# Patient Record
Sex: MALE | URBAN - METROPOLITAN AREA
[De-identification: names, ages, dates, MRNs, and addresses within clinical notes are randomized per-mention and may not be internally consistent; named-entity substitution may affect disease eponyms.]

---

## 2022-08-03 ENCOUNTER — OFFICE VISIT (OUTPATIENT)
Dept: FAMILY MEDICINE CLINIC | Age: 37
End: 2022-08-03
Payer: COMMERCIAL

## 2022-08-03 VITALS
BODY MASS INDEX: 30.63 KG/M2 | WEIGHT: 190.6 LBS | SYSTOLIC BLOOD PRESSURE: 118 MMHG | OXYGEN SATURATION: 98 % | HEIGHT: 66 IN | DIASTOLIC BLOOD PRESSURE: 75 MMHG | HEART RATE: 82 BPM

## 2022-08-03 DIAGNOSIS — Z76.89 ESTABLISHING CARE WITH NEW DOCTOR, ENCOUNTER FOR: ICD-10-CM

## 2022-08-03 DIAGNOSIS — Z00.00 WELL ADULT EXAM: Primary | ICD-10-CM

## 2022-08-03 PROCEDURE — 99385 PREV VISIT NEW AGE 18-39: CPT | Performed by: FAMILY MEDICINE

## 2022-08-03 RX ORDER — ESOMEPRAZOLE MAGNESIUM 20 MG/1
20 FOR SUSPENSION ORAL DAILY
COMMUNITY

## 2022-08-03 SDOH — ECONOMIC STABILITY: FOOD INSECURITY: WITHIN THE PAST 12 MONTHS, YOU WORRIED THAT YOUR FOOD WOULD RUN OUT BEFORE YOU GOT MONEY TO BUY MORE.: NEVER TRUE

## 2022-08-03 SDOH — ECONOMIC STABILITY: FOOD INSECURITY: WITHIN THE PAST 12 MONTHS, THE FOOD YOU BOUGHT JUST DIDN'T LAST AND YOU DIDN'T HAVE MONEY TO GET MORE.: NEVER TRUE

## 2022-08-03 ASSESSMENT — PATIENT HEALTH QUESTIONNAIRE - PHQ9
2. FEELING DOWN, DEPRESSED OR HOPELESS: 0
SUM OF ALL RESPONSES TO PHQ9 QUESTIONS 1 & 2: 0
SUM OF ALL RESPONSES TO PHQ QUESTIONS 1-9: 0
1. LITTLE INTEREST OR PLEASURE IN DOING THINGS: 0

## 2022-08-03 ASSESSMENT — SOCIAL DETERMINANTS OF HEALTH (SDOH): HOW HARD IS IT FOR YOU TO PAY FOR THE VERY BASICS LIKE FOOD, HOUSING, MEDICAL CARE, AND HEATING?: NOT HARD AT ALL

## 2022-08-08 ENCOUNTER — HOSPITAL ENCOUNTER (OUTPATIENT)
Age: 37
Setting detail: SPECIMEN
Discharge: HOME | End: 2022-08-08

## 2022-08-08 DIAGNOSIS — Z00.00 WELL ADULT EXAM: ICD-10-CM

## 2022-08-08 LAB
ABSOLUTE EOS #: 0.2 K/UL (ref 0–0.44)
ABSOLUTE IMMATURE GRANULOCYTE: <0.03 K/UL (ref 0–0.3)
ABSOLUTE LYMPH #: 2.52 K/UL (ref 1.1–3.7)
ABSOLUTE MONO #: 0.39 K/UL (ref 0.1–1.2)
BASOPHILS # BLD: 1 % (ref 0–2)
BASOPHILS ABSOLUTE: 0.04 K/UL (ref 0–0.2)
EOSINOPHILS RELATIVE PERCENT: 4 % (ref 1–4)
HCT VFR BLD CALC: 44.8 % (ref 40.7–50.3)
HEMOGLOBIN: 14.8 G/DL (ref 13–17)
IMMATURE GRANULOCYTES: 0 %
LYMPHOCYTES # BLD: 56 % (ref 24–43)
MCH RBC QN AUTO: 28.3 PG (ref 25.2–33.5)
MCHC RBC AUTO-ENTMCNC: 33 G/DL (ref 28.4–34.8)
MCV RBC AUTO: 85.7 FL (ref 82.6–102.9)
MONOCYTES # BLD: 9 % (ref 3–12)
NRBC AUTOMATED: 0 PER 100 WBC
PDW BLD-RTO: 13.9 % (ref 11.8–14.4)
PLATELET # BLD: 332 K/UL (ref 138–453)
PMV BLD AUTO: 10.5 FL (ref 8.1–13.5)
RBC # BLD: 5.23 M/UL (ref 4.21–5.77)
SEG NEUTROPHILS: 30 % (ref 36–65)
SEGMENTED NEUTROPHILS ABSOLUTE COUNT: 1.34 K/UL (ref 1.5–8.1)
WBC # BLD: 4.5 K/UL (ref 3.5–11.3)

## 2022-08-09 LAB
ALBUMIN SERPL-MCNC: 4.6 G/DL (ref 3.5–5.2)
ALBUMIN/GLOBULIN RATIO: 1.6 (ref 1–2.5)
ALP BLD-CCNC: 47 U/L (ref 40–129)
ALT SERPL-CCNC: 57 U/L (ref 5–41)
ANION GAP SERPL CALCULATED.3IONS-SCNC: 21 MMOL/L (ref 9–17)
AST SERPL-CCNC: 34 U/L
BILIRUB SERPL-MCNC: 0.9 MG/DL (ref 0.3–1.2)
BUN BLDV-MCNC: 11 MG/DL (ref 6–20)
CALCIUM SERPL-MCNC: 9.8 MG/DL (ref 8.6–10.4)
CHLORIDE BLD-SCNC: 104 MMOL/L (ref 98–107)
CHOLESTEROL/HDL RATIO: 3.8
CHOLESTEROL: 215 MG/DL
CO2: 19 MMOL/L (ref 20–31)
CREAT SERPL-MCNC: 0.65 MG/DL (ref 0.7–1.2)
GFR AFRICAN AMERICAN: >60 ML/MIN
GFR NON-AFRICAN AMERICAN: >60 ML/MIN
GFR SERPL CREATININE-BSD FRML MDRD: ABNORMAL ML/MIN/{1.73_M2}
GLUCOSE BLD-MCNC: 117 MG/DL (ref 70–99)
HDLC SERPL-MCNC: 56 MG/DL
LDL CHOLESTEROL: 142 MG/DL (ref 0–130)
POTASSIUM SERPL-SCNC: 4.7 MMOL/L (ref 3.7–5.3)
SODIUM BLD-SCNC: 144 MMOL/L (ref 135–144)
THYROXINE, FREE: 1.33 NG/DL (ref 0.93–1.7)
TOTAL PROTEIN: 7.5 G/DL (ref 6.4–8.3)
TRIGL SERPL-MCNC: 86 MG/DL
TSH SERPL DL<=0.05 MIU/L-ACNC: 1.31 UIU/ML (ref 0.3–5)

## 2022-08-28 NOTE — PROGRESS NOTES
[facility]  [facility] [address] [place] [address]  Dept: [phone]      Ralph Tijerina is a 40 y.o. male who presents today for follow up on his  medical conditions as noted below. Chief Complaint   Patient presents with    New Patient       There is no problem list on file for this patient. History reviewed. No pertinent past medical history. History reviewed. No pertinent surgical history. History reviewed. No pertinent family history. Current Outpatient Medications   Medication Sig Dispense Refill    Multiple Vitamin (MULTI VITAMIN DAILY PO) Take by mouth      esomeprazole Magnesium (NEXIUM) 20 MG PACK Take 20 mg by mouth in the morning. No current facility-administered medications for this visit. ALLERGIES:  No Known Allergies    Social History     Tobacco Use    Smoking status: Former     Packs/day: 1.00     Years: 12.00     Pack years: 12.00     Types: Cigarettes     Quit date: 2019     Years since quitting: 3.6    Smokeless tobacco: Never   Substance Use Topics    Alcohol use: Not on file        LDL Cholesterol (mg/dL)   Date Value   08/08/2022 142 (H)     HDL (mg/dL)   Date Value   08/08/2022 56     BUN (mg/dL)   Date Value   08/08/2022 11     Creatinine (mg/dL)   Date Value   08/08/2022 0.65 (L)     Glucose (mg/dL)   Date Value   08/08/2022 117 (H)              Subjective:      HPI  He is being seen today as a new patient to establish care for well visit overall he is doing fine he does not have any serious ongoing medical problems he occasionally gets reflux that he takes Nexium over-the-counter for and he takes a multivitamin he has not done laboratory studies in some time    Review of Systems:     Constitutional: Negative for fever, appetite change and fatigue. Family social and medical history reviewed and unchanged     HENT: Negative. Negative for nosebleeds, trouble swallowing and neck pain. Eyes: Negative for photophobia and visual disturbance. Respiratory: Negative. Negative for chest tightness and shortness of breath. Cardiovascular: Negative. Negative for chest pain and leg swelling. Gastrointestinal: Negative. Negative for abdominal pain and blood in stool. Endocrine: Negative for cold intolerance and polyuria. Genitourinary: Negative for dysuria and hematuria. Musculoskeletal: Negative. Skin: Negative for rash. Allergic/Immunologic: Negative. Neurological: Negative. Negative for dizziness, weakness and numbness. Hematological: Negative. Negative for adenopathy. Does not bruise/bleed easily. Psychiatric/Behavioral: Negative for sleep disturbance, dysphoric mood and  decreased concentration. The patient is not nervous/anxious. Objective:     Physical Exam:     Nursing note and vitals reviewed. /75   Pulse 82   Ht 5' 6\" (1.676 m)   Wt 190 lb 9.6 oz (86.5 kg)   SpO2 98%   BMI 30.76 kg/m²   Constitutional: He is oriented to person, place, and time. He   appears well-developed and well-nourished. HENT:   Head: Normocephalic and atraumatic. Right Ear: External ear normal. Tympanic membrane is not erythematous. No middle ear effusion. Left Ear: External ear normal. Tympanic membrane is not erythematous. No middle ear effusion. Nose: No mucosal edema. Mouth/Throat: Oropharynx is clear and moist. No posterior oropharyngeal erythema. Eyes: Conjunctivae and EOM are normal. Pupils are equal, round, and reactive to light. Neck: Normal range of motion. Neck supple. No thyromegaly present. Cardiovascular: Normal rate, regular rhythm and normal heart sounds. No murmur heard. Pulmonary/Chest: Effort normal and breath sounds normal. He has no wheezes. Hehas no rales. Abdominal: Soft. Bowel sounds are normal. He exhibits no distension and no mass. There is no tenderness. There is no rebound and no guarding. Genitourinary/Anorectal:deferred  Musculoskeletal: Normal range of motion. He exhibits no edema or tenderness. Lymphadenopathy: He has no cervical adenopathy. Neurological: He is alert and oriented to person, place, and time. He has normal reflexes. Skin: Skin is warm and dry. No rash noted. Psychiatric: He has a normal mood and affect. His   behavior is normal.       Assessment:      1. Well adult exam    2. Establishing care with new doctor, encounter for          Plan:      Call or return to clinic prn if these symptoms worsen or fail to improve as anticipated. I have reviewed the instructions with the patient, answering all questions to his satisfaction. No follow-ups on file. Orders Placed This Encounter   Procedures    CBC with Auto Differential     Standing Status:   Future     Number of Occurrences:   1     Standing Expiration Date:   2/3/2023    Comprehensive Metabolic Panel     Standing Status:   Future     Number of Occurrences:   1     Standing Expiration Date:   2/3/2023    Lipid Panel     Standing Status:   Future     Number of Occurrences:   1     Standing Expiration Date:   2/3/2023     Order Specific Question:   Is Patient Fasting?/# of Hours     Answer:   yes    T4, Free     Standing Status:   Future     Number of Occurrences:   1     Standing Expiration Date:   2/3/2023    TSH     Standing Status:   Future     Number of Occurrences:   1     Standing Expiration Date:   2/3/2023     No orders of the defined types were placed in this encounter.     Fu prn  Electronically signed by Ling Rico DO on 8/28/2022 at 7:35 PM

## 2022-09-19 ENCOUNTER — HOSPITAL ENCOUNTER (OUTPATIENT)
Age: 37
Setting detail: SPECIMEN
Discharge: HOME | End: 2022-09-19

## 2022-09-19 ENCOUNTER — OFFICE VISIT (OUTPATIENT)
Dept: FAMILY MEDICINE CLINIC | Age: 37
End: 2022-09-19
Payer: COMMERCIAL

## 2022-09-19 VITALS
WEIGHT: 186.2 LBS | HEIGHT: 66 IN | OXYGEN SATURATION: 98 % | HEART RATE: 72 BPM | BODY MASS INDEX: 29.92 KG/M2 | SYSTOLIC BLOOD PRESSURE: 127 MMHG | DIASTOLIC BLOOD PRESSURE: 87 MMHG

## 2022-09-19 DIAGNOSIS — M62.830 SPASM OF THORACIC BACK MUSCLE: ICD-10-CM

## 2022-09-19 DIAGNOSIS — R74.8 ELEVATED LIVER ENZYMES: ICD-10-CM

## 2022-09-19 DIAGNOSIS — Z13.1 SCREENING FOR DIABETES MELLITUS: ICD-10-CM

## 2022-09-19 DIAGNOSIS — E55.9 VITAMIN D DEFICIENCY: Primary | ICD-10-CM

## 2022-09-19 DIAGNOSIS — E55.9 VITAMIN D DEFICIENCY: ICD-10-CM

## 2022-09-19 LAB
ALBUMIN SERPL-MCNC: 4.9 G/DL (ref 3.5–5.2)
ALBUMIN/GLOBULIN RATIO: 1.8 (ref 1–2.5)
ALP BLD-CCNC: 49 U/L (ref 40–129)
ALT SERPL-CCNC: 53 U/L (ref 5–41)
AST SERPL-CCNC: 28 U/L
BILIRUB SERPL-MCNC: 1.1 MG/DL (ref 0.3–1.2)
BILIRUBIN DIRECT: 0.2 MG/DL
BILIRUBIN, INDIRECT: 0.9 MG/DL (ref 0–1)
ESTIMATED AVERAGE GLUCOSE: 134 MG/DL
HBA1C MFR BLD: 6.3 % (ref 4–6)
TOTAL PROTEIN: 7.7 G/DL (ref 6.4–8.3)
VITAMIN D 25-HYDROXY: 29.4 NG/ML

## 2022-09-19 PROCEDURE — G8417 CALC BMI ABV UP PARAM F/U: HCPCS | Performed by: FAMILY MEDICINE

## 2022-09-19 PROCEDURE — 1036F TOBACCO NON-USER: CPT | Performed by: FAMILY MEDICINE

## 2022-09-19 PROCEDURE — 99214 OFFICE O/P EST MOD 30 MIN: CPT | Performed by: FAMILY MEDICINE

## 2022-09-19 PROCEDURE — G8427 DOCREV CUR MEDS BY ELIG CLIN: HCPCS | Performed by: FAMILY MEDICINE

## 2022-09-19 ASSESSMENT — PATIENT HEALTH QUESTIONNAIRE - PHQ9
SUM OF ALL RESPONSES TO PHQ QUESTIONS 1-9: 0
1. LITTLE INTEREST OR PLEASURE IN DOING THINGS: 0
SUM OF ALL RESPONSES TO PHQ QUESTIONS 1-9: 0
SUM OF ALL RESPONSES TO PHQ9 QUESTIONS 1 & 2: 0
2. FEELING DOWN, DEPRESSED OR HOPELESS: 0

## 2022-09-19 NOTE — PROGRESS NOTES
[facility] MEDICINE  [facility] [address] [place] [address]  Dept: [phone]      Jabari Delgado is a 40 y.o. male who presents today for follow up on his  medical conditions as noted below. Chief Complaint   Patient presents with    Neck Pain    Other     Patient wants to know vitamin D levels         There is no problem list on file for this patient. History reviewed. No pertinent past medical history. History reviewed. No pertinent surgical history. History reviewed. No pertinent family history. Current Outpatient Medications   Medication Sig Dispense Refill    Multiple Vitamin (MULTI VITAMIN DAILY PO) Take by mouth      esomeprazole Magnesium (NEXIUM) 20 MG PACK Take 20 mg by mouth in the morning. No current facility-administered medications for this visit.      ALLERGIES:  No Known Allergies    Social History     Tobacco Use    Smoking status: Former     Packs/day: 1.00     Years: 12.00     Pack years: 12.00     Types: Cigarettes     Quit date: 2019     Years since quitting: 3.7    Smokeless tobacco: Never   Substance Use Topics    Alcohol use: Not on file        LDL Cholesterol (mg/dL)   Date Value   08/08/2022 142 (H)     HDL (mg/dL)   Date Value   08/08/2022 56     BUN (mg/dL)   Date Value   08/08/2022 11     Creatinine (mg/dL)   Date Value   08/08/2022 0.65 (L)     Glucose (mg/dL)   Date Value   08/08/2022 117 (H)              Subjective:      HPI  Is being seen today for follow-up on laboratory studies he was concerned about his vitamin D  His blood sugar and his liver enzyme with I did mention that he should get rechecked but he wants to do that now  He also states for some time now he has had ongoing thoracic discomfort bilaterally into his trapezius region he did go to chiropractor denies any help he does not remember any specific injury    Review of Systems:     Constitutional: Negative for fever, appetite change and fatigue. Family social and medical history reviewed and unchanged     HENT: Negative. Negative for nosebleeds, trouble swallowing and neck pain. Eyes: Negative for photophobia and visual disturbance. Respiratory: Negative. Negative for chest tightness and shortness of breath. Cardiovascular: Negative. Negative for chest pain and leg swelling. Gastrointestinal: Negative. Negative for abdominal pain and blood in stool. Endocrine: Negative for cold intolerance and polyuria. Genitourinary: Negative for dysuria and hematuria. Musculoskeletal: Negative. Skin: Negative for rash. Allergic/Immunologic: Negative. Neurological: Negative. Negative for dizziness, weakness and numbness. Hematological: Negative. Negative for adenopathy. Does not bruise/bleed easily. Psychiatric/Behavioral: Negative for sleep disturbance, dysphoric mood and  decreased concentration. The patient is not nervous/anxious. Objective:     Physical Exam:     Nursing note and vitals reviewed. /87   Pulse 72   Ht 5' 6\" (1.676 m)   Wt 186 lb 3.2 oz (84.5 kg)   SpO2 98%   BMI 30.05 kg/m²   Constitutional: He is oriented to person, place, and time. He   appears well-developed and well-nourished. HENT:   Head: Normocephalic and atraumatic. Right Ear: External ear normal. Tympanic membrane is not erythematous. No middle ear effusion. Left Ear: External ear normal. Tympanic membrane is not erythematous. No middle ear effusion. Nose: No mucosal edema. Mouth/Throat: Oropharynx is clear and moist. No posterior oropharyngeal erythema. Eyes: Conjunctivae and EOM are normal. Pupils are equal, round, and reactive to light. Neck: Normal range of motion. Neck supple. No thyromegaly present. Cardiovascular: Normal rate, regular rhythm and normal heart sounds. No murmur heard. Pulmonary/Chest: Effort normal and breath sounds normal. He has no wheezes. Hehas no rales. Abdominal: Soft.  Bowel sounds are normal. He exhibits no distension and no mass. There is no tenderness. There is no rebound and no guarding. Genitourinary/Anorectal:deferred  Musculoskeletal: Normal range of motion. He exhibits no edema or tenderness. Tightness bilateral trapezius areas full range of motion of his upper extremities and neck  Lymphadenopathy: He has no cervical adenopathy. Neurological: He is alert and oriented to person, place, and time. He has normal reflexes. Skin: Skin is warm and dry. No rash noted. Psychiatric: He has a normal mood and affect. His   behavior is normal.       Assessment:      1. Vitamin D deficiency    2. Screening for diabetes mellitus    3. Elevated liver enzymes    4. Spasm of thoracic back muscle          Plan:      Call or return to clinic prn if these symptoms worsen or fail to improve as anticipated. I have reviewed the instructions with the patient, answering all questions to his satisfaction. No follow-ups on file. Orders Placed This Encounter   Procedures    Vitamin D 25 Hydroxy     Standing Status:   Future     Standing Expiration Date:   12/19/2022    Hemoglobin A1C     Standing Status:   Future     Standing Expiration Date:   10/19/2022    Hepatic Function Panel     Standing Status:   Future     Standing Expiration Date:   3/19/2023    [id number] [facility] Physical Therapy [facility]     Referral Priority:   Routine     Referral Type:   Eval and Treat     Referral Reason:   Specialty Services Required     Requested Specialty:   Physical Therapist     Number of Visits Requested:   1     No orders of the defined types were placed in this encounter.       Electronically signed by Darryle Goo, DO on 9/19/2022 at 12:15 PM

## 2022-09-23 ENCOUNTER — HOSPITAL ENCOUNTER (OUTPATIENT)
Dept: PHYSICAL THERAPY | Facility: CLINIC | Age: 37
Setting detail: THERAPIES SERIES
Discharge: HOME | End: 2022-09-23
Payer: COMMERCIAL

## 2022-09-23 PROCEDURE — 97161 PT EVAL LOW COMPLEX 20 MIN: CPT

## 2022-09-23 NOTE — CONSULTS
[] [facility]) - [facility] &  Therapy  [address]  [phone]  F: [phone] [x] [address]  [place] [address]   [address]  P: [phone]  F: [phone] [] [address] &  Therapy  [address]  P: [phone]  F: [phone] [] [address]  P: [phone]  F: [phone] [] [address]  [facility]   [address]   [place]: [phone]  F: [phone]          Physical Therapy Spine Evaluation    Date:  2022  Patient: Giovany Bishop  : 1985  MRN: [id number]  Physician: Harvey Reagan DO  Insurance: [place] [employer] (limit  remain)  Medical Diagnosis: M62.830 (ICD-10-CM) - Spasm of thoracic back muscle  Rehab Codes: M25.60, M54.2, R29.3  Onset Date: 21  Next 's appt.: TBD    Subjective:   Pt initially did not require  however was having difficulty comprehending therapist's subjective questions thus agreed to use [place] . Pt arrived to physical therapy with c/o neck pain with muscle tightness and spasm along B upper trap/levator scapulae to middle trap/rhomboids area. Pt noted this occurred about a yea ago without known cause but was first noticing pain and muscle spasm when driving. 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 Pt noted intermittent numbness/tingling in RUE after holding phone for about 2 hours straight or more.   Pt reported went to see Chiropractor for only 1 session d/t having to move city, noted \"he cracks my neck and did imaging\", noted \"imaging says either an infection or inflammation in that area\" as pt pointed to C7 and T1-T3. Pt noted ibuprofen, massage with Tiger palm, and Tiger palm pan relief patch help reducing symptoms. Pt noted also bought an upper back brace to wear for support to improve posture when studying which also helped. Pain present? [] Yes  [x] No    Location  Neck, B upper trap/levator, cervical paraspinals    Pain Rating currently  (0-10 scale)   0/10   Pain at worse 10/10   Pain at best 0/10   Description of pain \"Tight, spasm, pulling\"   Altered Sensation Intermittent numbness/tingling to RUE when holding phone for +2hrs   What makes it worse Prolonged sitting on floor to study on laptop for 1/2 hours or more, stress about exams   Looking at phone for 2 hours or more  Driving   What makes it better On days with no class, no study, no exam   Symptom progression Same    Sleep Ok        Comorbidities:   [] Obesity [] Dialysis  [x] N/A   [] Asthma/COPD [] Dementia [] Other:   [] Stroke [] Sleep apnea [] Other:   [] Vascular disease [] Rheumatic disease [] Other:          PMHx: [x] Refer to full medical chart  In EPIC       [] Unremarkable [] Diabetes    [] HTN        [] Pacemaker      [] MI/Heart Problems       [] Cancer      [] Arthritis  [] Other: [] Other:       Tests: none recently   [x] X-Ray: (past) from Chiropractor before moving to [place]   [] MRI:    [] Other:      Medications: [x] Refer to full medical record                          [] None                          [] Other:    Allergies:      [x] Refer to full medical record                         [] None                         [] Other:      ADL/IADL Previous level of function Current level of function Who currently assists the patient with task   Bathing  [x] Independent  [] Assist [x] Independent  [] Assist    Dress/grooming [x] Independent  [] Assist [x] Independent  [] Assist    Transfer/mobility [x] Independent  [] Assist [x] Independent  [] Assist    Feeding [x] Independent  [] Assist [] Independent  [] Assist    Toileting [x] Independent  [] Assist [x] Independent  [] Assist    Driving [x] Independent  [] Assist [x] Independent  [] Assist    Housekeeping [x] Independent  [] Assist [x] Independent  [] Assist    Grocery shop/meal prep [x] Independent  [] Assist [x] Independent  [] Assist        Gait Prior level of function Current level of function    [x] Independent  [] Assist [x] Independent  [] Assist   Device: [x] Independent [x] Independent    [] Straight Cane [] Quad cane [] Straight Cane [] Quad cane    [] Standard walker [] Rolling walker   [] 4 wheeled walker [] Standard walker [] Rolling walker   [] 4 wheeled walker    [] Wheelchair [] Wheelchair         Objective:    OBSERVATION No Deficit Deficit Not Tested Comments   Posture       Forward Head [] [x] []    Rounded Shoulders [] [x] []    Kyphosis [] [x] []    Lordosis [] [] [x]    Leg Length Discrp [] [] [x]    Slumped Sitting [] [x] []    Palpation [] [x] [] B scapular winging  High tone throughout cervical paraspinals, B upper trap/levator scap to mid trap/rhomboids  TTP along cervical paraspinals, B upper trap/levator scap, and T2-T4 transverse progresses   Sensation [x] [] []    Edema [x] [] []    Neurological [x] [] []              STRENGTH    Left Right   C5 Shld Abd 5/5 5/5   Shld Flexion 5/5 5/5   Shld IR 5/5 5/5   Shld ER 5/5 5/5   C6 Elb Flex 5/5 5/5   C7 Elb Ext 5/5 5/5   *painful     ROM   Cervical    Flexion WFL   Extension 15% limited (+) pain/tightness   Sidebend  L 20% limited  (+) tightness R upper trap/levator scap   R 20% limited  (+) tightness L upper trap/levator scap    Rotation L 20% restricted   (+) tightness L upper trap R WFL           Functional Test: NDI Score: 13/50 or 26% functionally impaired         Assessment: 41 yo male presents to physical therapy with c/o neck pain and muscle spasm along cervical paraspinals, B upper trap/levator scapulae areas occurred about a year ago.   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 Pt reported pain increased about 1/2 hour of sitting on floor to study for exam, when pt is stress about upcoming exams, when driving long distances, and intermittent numbness/tingling presents when holding phone for 2 hours straight. Pt demo restricted cervical ROM in extension, B SB, L rot, and suboccipitals, decreased deep neck flexors and scapulothoracic stabilizers strength with palpable B scapular winging and forward head/rounded shoulder posture presents. Patient would benefit from skilled physical therapy services in order to: manage pain, improve cervical mobility, improve deep neck flexors and scapulothoracic stabilizers strength, and promote proper posture to assist pt in returning to prior function. Problems:    [x] ? Pain: up to 10/10 neck, B upper trap/levator scapulae, cervical paraspinals   [x] ? ROM: cervical extension, B SB, L rot, suboccipitals   [x] ? Strength: deep neck flexors, scapulothoracic stabilizers   [x] ? Function: NDI 26% impaired  [x] Other: impaired posture         LTG: (to be met in 5 treatments)  Pt will reduce pain to less than or equal to 5/10 to reduce difficulty when studying for exams and long distance driving  Pt will be able to perform 20x chin tuck without cues provided and proper technique to demo improved activation and awareness of deep neck flexors and suboccipitals tissue mobility   Pt will report improved study ergonomics to reduce muscle straining throughout paraspinals to reduce risk of re-injury  Pt will be able to maintain proper upright posture without cues provided for at least 30 mins to demo improved postural awareness   Patient to be independent with home exercise program as demonstrated by performance with correct form without cues.   Pt will improve NDI score from 26% impaired to less than 16% impaired to demo improved functional mobility to participate in daily functional activities        Patient goals: \"treat my pain\"         Rehab Potential:  [x] Good  [] Fair  [] Poor   Suggested Professional Referral:  [x] No  [] Yes:  Barriers to Goal Achievement:  [x] No  [] Yes:  Domestic Concerns:  [x] No  [] Yes:      Pt. Education:  [x] Plans/Goals, Risks/Benefits discussed  [x] Home exercise program  Method of Education: [x] Verbal  [x] Demo  [x] Written  Comprehension of Education:  [x] Verbalizes understanding. [x] Demonstrates understanding. [] Needs Review. [] Demonstrates/verbalizes understanding of HEP/Ed previously given. Access Code: [email or web address]  URL: [email or web address]  Date: 09/23/2022  Prepared by:  Beryl Fenton    Exercises  Seated Scapular Retraction - 1 x daily - 7 x weekly - 2 sets - 10 reps - 5s hold  Seated Upper Trapezius Stretch - 1 x daily - 7 x weekly - 1 sets - 3 reps - 30s hold  Seated Levator Scapulae Stretch - 1 x daily - 7 x weekly - 1 sets - 3 reps - 30s hold  Seated Cervical Retraction - 1 x daily - 7 x weekly - 1 sets - 10 reps - 5s hold  Seated Shoulder Rolls - 1 x daily - 7 x weekly - 2 sets - 10 reps  Seated Cervical Rotation AROM - 1 x daily - 7 x weekly - 2 sets - 10 reps  Seated Cervical Sidebending AROM - 1 x daily - 7 x weekly - 2 sets - 10 reps  Standing Cervical Extension AROM - 1 x daily - 7 x weekly - 2 sets - 10 reps  Standing Cervical Flexion AROM - 1 x daily - 7 x weekly - 2 sets - 10 reps  Seated Correct Posture - 1 x daily - 7 x weekly - 2 sets - 10 reps            Treatment Plan:  [x] Therapeutic Exercise   13191  [] Iontophoresis: 4 mg/mL Dexamethasone Sodium Phosphate  mAmin  35303   [] Therapeutic Activity  13851 [] Vasopneumatic cold with compression  90073    [] Gait Training   82889 [] Ultrasound   [id number]   [] Neuromuscular Re-education  08121 [] Electrical Stimulation Unattended  61209   [x] Manual Therapy  68853 [] Electrical Stimulation Attended  [id number]   [x] Instruction in HEP  [] Lumbar/Cervical Traction  [id number]   [] Aquatic Therapy   12408 [x] Cold/hotpack    [] Massage   [id number] [] Dry Needling, 1 or 2 muscles  25654   [] Biofeedback, first 15 minutes   52112  [] Biofeedback, additional 15 minutes   17764 [] Dry Needling, 3 or more muscles  65844       Frequency:  1 x/week for 5 visits        Todays Treatment:  Modalities:   Precautions: Greenlandic    Exercises:  Exercise Reps/ Time Weight/ Level Comments         Seated      B UT/levator scap stretch 10s ea     Scap squeeze 10x5s                 Other:   - Postural education  - Study ergonomics: instruct to sit in chair with back support & use towel roll to place behind back to cues for upright posture, use table with appropriate height; take breaks every 30-45 mins from studying to complete stretching and cervical mobility per HEP to avoid prolonged rounded shoulder and kyphotic posture when study for exam       Specific Instructions for next treatment:  - Improve cervical mobility  - Strengthen B scapulothoracic stabilizers, core, and deep neck flexors  - SOR, hypervolt/manual to B upper trap/levator scap and surrounding musculatures   - Review postural education and study ergonomics       Evaluation Complexity:  History (Personal factors, comorbidities) [x] 0 [] 1-2 [] 3+   Exam (limitations, restrictions) [] 1-2 [x] 3 [] 4+   Clinical presentation (progression) [x] Stable [] Evolving  [] Unstable   Decision Making [x] Low [] Moderate [] High    [] Low Complexity [] Moderate Complexity [] High Complexity       Treatment Charges: Mins Units   [x] Evaluation       [x]  Low       []  Moderate       []  High 40 1   []  Modalities     [x]  Ther Exercise 5 --   []  Manual Therapy     []  Ther Activities     []  Aquatics     []  Vasocompression     []  Other       TOTAL TREATMENT TIME: 45 mins     Time in: 12:35 pm       Time out: 1:30 pm    Electronically signed by:  Beryl Jimenez PT        Physician Signature:________________________________Date:__________________  By signing above or cosigning this note, I have reviewed this plan of care and certify a need for medically necessary rehabilitation services.      *PLEASE SIGN ABOVE AND FAX BACK ALL PAGES*

## 2022-09-30 ENCOUNTER — HOSPITAL ENCOUNTER (OUTPATIENT)
Dept: PHYSICAL THERAPY | Facility: CLINIC | Age: 37
Setting detail: THERAPIES SERIES
Discharge: HOME | End: 2022-09-30
Payer: COMMERCIAL

## 2022-09-30 PROCEDURE — 97110 THERAPEUTIC EXERCISES: CPT

## 2022-09-30 NOTE — FLOWSHEET NOTE
[] [facility]  [address]  [phone]  F: [phone] [x] [address]  [facility]   [address]  P: [phone]  F: [phone] [] [place] &  Therapy  [address] [facility]  P: [phone]  F: [phone] [] [address]  P: [phone]  F: [phone] [] [address]  [facility]   [address]   [place]: [phone]  F: [phone]      Physical Therapy Daily Treatment Note    Date:  2022  Patient Name:  Sonu Lopez    :  1985  MRN: [id number]  Physician: Earle Muñoz DO                     Insurance: [place] [employer] (limit  remain)  Medical Diagnosis: M62.830 (ICD-10-CM) - Spasm of thoracic back muscle  Rehab Codes: M25.60, M54.2, R29.3  Onset Date: 21               Next 's appt.: TBD    Visit# / total visits: 2/5    Cancels/No Shows: 0/0    Subjective:    Pain:  [] Yes  [x] No Location: Neck, B upper trap/levator, cervical paraspinals   Pain Rating: (0-10 scale) 0/10  Pain altered Tx:  [x] No  [] Yes  Action:  Comments: Pt arrives stating he has been completing HEP daily and his pain symptoms have resolved. Pt reports he would like to discontinue therapy at this time.      Objective:  Modalities:   Precautions: English    Exercises:  Exercise Reps/ Time Weight/ Level Comments         Seated         C-AROM 10xea   Flexion/extension/side bend/rotation    B UT stretch  3x30\"ea       Levator scap stretch  3x30\"ea     Scap squeeze 10x5s       Cervical retraction \"chin tucks\"  10x        posterior shoulder rolls  10x              Other:   - Postural education-   - Study ergonomics: instruct to sit in chair with back support & use towel roll to place behind back to cues for upright posture, use table with appropriate height; take breaks every 30-45 mins from studying to complete stretching and cervical mobility per HEP to avoid prolonged rounded shoulder and kyphotic posture when study for exam - reviewed 9/30         Specific Instructions for next treatment:  - Improve cervical mobility  - Strengthen B scapulothoracic stabilizers, core, and deep neck flexors  - SOR, hypervolt/manual to B upper trap/levator scap and surrounding musculatures   - Review postural education and study ergonomics       Treatment Charges: Mins Units   []  Modalities     [x]  Ther Exercise 45 3   []  Manual Therapy     []  Ther Activities     []  Aquatics     []  Vasocompression     []  Other     Total Treatment time 45 3       Assessment: [x] Progressing toward goals. Focused session on education and reviewing proper form/technique of HEP. Provided update handout of thoracic stretches to progress program with good understanding. Also educated on back support such a lumbar roll, wedge, and cervical support to place in vehicle with good understanding. Pt is to be put on hold from therapy at this time as he has no pain symptoms and is compliant with HEP. Therapist advised pt to reach out to clinic if pain symptoms resume and if he has any other questions. [] No change. [] Other:  [x] Patient would continue to benefit from skilled physical therapy services in order to: manage pain, improve cervical mobility, improve deep neck flexors and scapulothoracic stabilizers strength, and promote proper posture to assist pt in returning to prior function. Problems:    [x] ? Pain: up to 10/10 neck, B upper trap/levator scapulae, cervical paraspinals   [x] ? ROM: cervical extension, B SB, L rot, suboccipitals   [x] ? Strength: deep neck flexors, scapulothoracic stabilizers   [x] ?  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 Pt will improve NDI score from 26% impaired to less than 16% impaired to demo improved functional mobility to participate in daily functional activities           Patient goals: \"treat my pain\"     Pt. Education:  [x] Yes  [] No  [x] Reviewed Prior HEP/Ed  Method of Education: [x] Verbal  [x] Demo  [x] Written  Comprehension of Education:  [x] Verbalizes understanding. [x] Demonstrates understanding. [] Needs review. [x] Demonstrates/verbalizes HEP/Ed previously given. Access Code: [email or web address]  URL: [email or web address]  Date: 09/23/2022  Prepared by:  Beryl Barnard     Exercises  Seated Scapular Retraction - 1 x daily - 7 x weekly - 2 sets - 10 reps - 5s hold  Seated Upper Trapezius Stretch - 1 x daily - 7 x weekly - 1 sets - 3 reps - 30s hold  Seated Levator Scapulae Stretch - 1 x daily - 7 x weekly - 1 sets - 3 reps - 30s hold  Seated Cervical Retraction - 1 x daily - 7 x weekly - 1 sets - 10 reps - 5s hold  Seated Shoulder Rolls - 1 x daily - 7 x weekly - 2 sets - 10 reps  Seated Cervical Rotation AROM - 1 x daily - 7 x weekly - 2 sets - 10 reps  Seated Cervical Sidebending AROM - 1 x daily - 7 x weekly - 2 sets - 10 reps  Standing Cervical Extension AROM - 1 x daily - 7 x weekly - 2 sets - 10 reps  Standing Cervical Flexion AROM - 1 x daily - 7 x weekly - 2 sets - 10 reps  Seated Correct Posture - 1 x daily - 7 x weekly - 2 sets - 10 reps  Date: 09/30/2022  Prepared by: Joana Rogel  Doorway Pec Stretch at 90 Degrees Abduction - 1 x daily - 7 x weekly - 3 sets - 10 reps  Shoulder Flexion Wall Slide with Towel - 1 x daily - 7 x weekly - 3 sets - 10 reps  Prone Scapular Retraction Arms at Side - 1 x daily - 7 x weekly - 3 sets - 10 reps  Prone W Scapular Retraction - 1 x daily - 7 x weekly - 3 sets - 10 reps  Sidelying Thoracic Rotation with Open Book - 1 x daily - 7 x weekly - 3 sets - 10 reps  Seated Thoracic Lumbar Extension with Pectoralis Stretch - 1 x daily - 7 x weekly - 3 sets - 10 reps         Plan: [x] Continue current frequency toward long and short term goals. [x] Specific Instructions for subsequent treatments: put on hold- discharge.        Time In: 12:00 pm            Time Out: 12:45  pm    Electronically signed by:  Joana Rogel PTA

## 2022-10-06 NOTE — DISCHARGE SUMMARY
[] [facility]) - [facility] &  Therapy  [address]  [phone]  F: [phone] [x] [address]  [facility]   [address]  P: [phone]  F: [phone] [] [address]  Therapy  [address]  P: [phone]  F: [phone] [] [address]  P: [phone]  F: [phone] [] [address]  [facility]   [address]   [place]: [phone]  F: [phone]    Physical Therapy Discharge Note    Date: 10/6/2022      Patient: Paula Smith  : 1985  MRN: [id number]    Physician: Mayo Irving DO                     Insurance: [place] [employer] (limit  remain)  Medical Diagnosis: M62.830 (ICD-10-CM) - Spasm of thoracic back muscle  Rehab Codes: M25.60, M54.2, R29.3  Onset Date: 21               Next 's appt.: TBD     Visit# / total visits: 2/5                        Cancels/No Shows: 0/0  Date of initial visit: 22                Date of final visit: 22       Discharge Status:     Pt reported symptoms have resolved and would like to discontinue therapy, noted also spoke with referral physician which was given an \"OK\" to be d/c from physical therapy services. Per pt request, pt is now discharged. Electronically signed by: Beryl Jones PT    If you have any questions or concerns, please don't hesitate to call.   Thank you for your referral.

## 2023-02-07 ENCOUNTER — HOSPITAL ENCOUNTER (OUTPATIENT)
Age: 38
Setting detail: SPECIMEN
Discharge: HOME | End: 2023-02-07

## 2023-02-07 ENCOUNTER — OFFICE VISIT (OUTPATIENT)
Dept: FAMILY MEDICINE CLINIC | Age: 38
End: 2023-02-07
Payer: COMMERCIAL

## 2023-02-07 VITALS
OXYGEN SATURATION: 98 % | WEIGHT: 194 LBS | HEART RATE: 70 BPM | HEIGHT: 66 IN | SYSTOLIC BLOOD PRESSURE: 119 MMHG | DIASTOLIC BLOOD PRESSURE: 78 MMHG | BODY MASS INDEX: 31.18 KG/M2

## 2023-02-07 DIAGNOSIS — R74.8 ELEVATED LIVER ENZYMES: Primary | ICD-10-CM

## 2023-02-07 DIAGNOSIS — E55.9 VITAMIN D DEFICIENCY: ICD-10-CM

## 2023-02-07 DIAGNOSIS — R73.03 PRE-DIABETES: ICD-10-CM

## 2023-02-07 DIAGNOSIS — E78.00 PURE HYPERCHOLESTEROLEMIA: ICD-10-CM

## 2023-02-07 LAB — HBA1C MFR BLD: 6.4 %

## 2023-02-07 PROCEDURE — G8417 CALC BMI ABV UP PARAM F/U: HCPCS | Performed by: FAMILY MEDICINE

## 2023-02-07 PROCEDURE — G8427 DOCREV CUR MEDS BY ELIG CLIN: HCPCS | Performed by: FAMILY MEDICINE

## 2023-02-07 PROCEDURE — 83036 HEMOGLOBIN GLYCOSYLATED A1C: CPT | Performed by: FAMILY MEDICINE

## 2023-02-07 PROCEDURE — 1036F TOBACCO NON-USER: CPT | Performed by: FAMILY MEDICINE

## 2023-02-07 PROCEDURE — 99214 OFFICE O/P EST MOD 30 MIN: CPT | Performed by: FAMILY MEDICINE

## 2023-02-07 PROCEDURE — G8484 FLU IMMUNIZE NO ADMIN: HCPCS | Performed by: FAMILY MEDICINE

## 2023-02-07 RX ORDER — METFORMIN HYDROCHLORIDE 500 MG/1
500 TABLET, EXTENDED RELEASE ORAL
Qty: 30 TABLET | Refills: 5 | Status: SHIPPED | OUTPATIENT
Start: 2023-02-07

## 2023-02-07 SDOH — ECONOMIC STABILITY: FOOD INSECURITY: WITHIN THE PAST 12 MONTHS, THE FOOD YOU BOUGHT JUST DIDN'T LAST AND YOU DIDN'T HAVE MONEY TO GET MORE.: NEVER TRUE

## 2023-02-07 SDOH — ECONOMIC STABILITY: HOUSING INSECURITY
IN THE LAST 12 MONTHS, WAS THERE A TIME WHEN YOU DID NOT HAVE A STEADY PLACE TO SLEEP OR SLEPT IN A SHELTER (INCLUDING NOW)?: NO

## 2023-02-07 SDOH — ECONOMIC STABILITY: INCOME INSECURITY: HOW HARD IS IT FOR YOU TO PAY FOR THE VERY BASICS LIKE FOOD, HOUSING, MEDICAL CARE, AND HEATING?: NOT HARD AT ALL

## 2023-02-07 SDOH — ECONOMIC STABILITY: FOOD INSECURITY: WITHIN THE PAST 12 MONTHS, YOU WORRIED THAT YOUR FOOD WOULD RUN OUT BEFORE YOU GOT MONEY TO BUY MORE.: NEVER TRUE

## 2023-02-07 ASSESSMENT — PATIENT HEALTH QUESTIONNAIRE - PHQ9
SUM OF ALL RESPONSES TO PHQ QUESTIONS 1-9: 0
1. LITTLE INTEREST OR PLEASURE IN DOING THINGS: 0
SUM OF ALL RESPONSES TO PHQ QUESTIONS 1-9: 0
SUM OF ALL RESPONSES TO PHQ QUESTIONS 1-9: 0
2. FEELING DOWN, DEPRESSED OR HOPELESS: 0
SUM OF ALL RESPONSES TO PHQ9 QUESTIONS 1 & 2: 0
SUM OF ALL RESPONSES TO PHQ QUESTIONS 1-9: 0

## 2023-02-07 NOTE — PROGRESS NOTES
[facility]  [facility] [address] [place] [address]  Dept: [phone]      Isabelle Fuentes is a 40 y.o. male who presents today for follow up on his  medical conditions as noted below. Chief Complaint   Patient presents with    Other     A1C       There is no problem list on file for this patient. History reviewed. No pertinent past medical history. No past surgical history on file. No family history on file. Current Outpatient Medications   Medication Sig Dispense Refill    metFORMIN (GLUCOPHAGE-XR) 500 MG extended release tablet Take 1 tablet by mouth daily (with breakfast) 30 tablet 5    Multiple Vitamin (MULTI VITAMIN DAILY PO) Take by mouth (Patient not taking: Reported on 2023)      esomeprazole Magnesium (NEXIUM) 20 MG PACK Take 20 mg by mouth in the morning. (Patient not taking: Reported on 2023)       No current facility-administered medications for this visit.      ALLERGIES:  No Known Allergies    Social History     Tobacco Use    Smoking status: Former     Packs/day: 1.00     Years: 12.00     Pack years: 12.00     Types: Cigarettes     Quit date:      Years since quittin.1    Smokeless tobacco: Never   Substance Use Topics    Alcohol use: Not on file        LDL Cholesterol (mg/dL)   Date Value   2022 142 (H)     HDL (mg/dL)   Date Value   2022 56     BUN (mg/dL)   Date Value   2022 11     Creatinine (mg/dL)   Date Value   2022 0.65 (L)     Glucose (mg/dL)   Date Value   2022 117 (H)     Hemoglobin A1C (%)   Date Value   2022 6.3 (H)              Subjective:      HPI  He is here today for follow-up on some abnormal lab studies from September he has been trying to work on his diet a bit his hemoglobin A1c is 6.4 today currently is not on any treatment unfortunately gained a little bit of weight he states he feels fine    Review of Systems:     Constitutional: Negative for fever, appetite change and fatigue. Family social and medical history reviewed and unchanged     HENT: Negative. Negative for nosebleeds, trouble swallowing and neck pain. Eyes: Negative for photophobia and visual disturbance. Respiratory: Negative. Negative for chest tightness and shortness of breath. Cardiovascular: Negative. Negative for chest pain and leg swelling. Gastrointestinal: Negative. Negative for abdominal pain and blood in stool. Endocrine: Negative for cold intolerance and polyuria. Genitourinary: Negative for dysuria and hematuria. Musculoskeletal: Negative. Skin: Negative for rash. Allergic/Immunologic: Negative. Neurological: Negative. Negative for dizziness, weakness and numbness. Hematological: Negative. Negative for adenopathy. Does not bruise/bleed easily. Psychiatric/Behavioral: Negative for sleep disturbance, dysphoric mood and  decreased concentration. The patient is not nervous/anxious. Objective:     Physical Exam:     Nursing note and vitals reviewed. /78   Pulse 70   Ht 5' 6\" (1.676 m)   Wt 194 lb (88 kg)   SpO2 98%   BMI 31.31 kg/m²   Constitutional: He is oriented to person, place, and time. He   appears well-developed and well-nourished. HENT:   Head: Normocephalic and atraumatic. Right Ear: External ear normal. Tympanic membrane is not erythematous. No middle ear effusion. Left Ear: External ear normal. Tympanic membrane is not erythematous. No middle ear effusion. Nose: No mucosal edema. Mouth/Throat: Oropharynx is clear and moist. No posterior oropharyngeal erythema. Eyes: Conjunctivae and EOM are normal. Pupils are equal, round, and reactive to light. Neck: Normal range of motion. Neck supple. No thyromegaly present. Cardiovascular: Normal rate, regular rhythm and normal heart sounds. No murmur heard. Pulmonary/Chest: Effort normal and breath sounds normal. He has no wheezes. Hehas no rales. Abdominal: Soft. Bowel sounds are normal. He exhibits no distension and no mass. There is no tenderness. There is no rebound and no guarding. Genitourinary/Anorectal:deferred  Musculoskeletal: Normal range of motion. He exhibits no edema or tenderness. Lymphadenopathy: He has no cervical adenopathy. Neurological: He is alert and oriented to person, place, and time. He has normal reflexes. Skin: Skin is warm and dry. No rash noted. Psychiatric: He has a normal mood and affect. His   behavior is normal.       Assessment:      1. Elevated liver enzymes    2. Vitamin D deficiency    3. Pure hypercholesterolemia    4. Pre-diabetes          Plan:      Call or return to clinic prn if these symptoms worsen or fail to improve as anticipated. I have reviewed the instructions with the patient, answering all questions to his satisfaction. Return in about 3 months (around 5/7/2023) for dm.   Orders Placed This Encounter   Procedures    Lipid Panel     Standing Status:   Future     Standing Expiration Date:   8/7/2023     Order Specific Question:   Is Patient Fasting?/# of Hours     Answer:   yes    Vitamin D 25 Hydroxy     Standing Status:   Future     Standing Expiration Date:   5/7/2023    Hepatic Function Panel     Standing Status:   Future     Standing Expiration Date:   8/7/2023    Protein / creatinine ratio, urine     Standing Status:   Future     Standing Expiration Date:   2/7/2024    POCT glycosylated hemoglobin (Hb A1C)     Orders Placed This Encounter   Medications    metFORMIN (GLUCOPHAGE-XR) 500 MG extended release tablet     Sig: Take 1 tablet by mouth daily (with breakfast)     Dispense:  30 tablet     Refill:  5     He needs to work on weight loss starting metformin and get other laboratory studies checked and follow-up in 3 months  Electronically signed by Kaci Chavez DO on 2/7/2023 at 12:06 PM   urine

## 2023-02-08 LAB
CREATININE URINE: 235.4 MG/DL (ref 39–259)
TOTAL PROTEIN, URINE: 12 MG/DL
URINE TOTAL PROTEIN CREATININE RATIO: 0.05 (ref 0–0.2)

## 2023-04-14 DIAGNOSIS — R73.03 PRE-DIABETES: ICD-10-CM

## 2023-04-17 RX ORDER — METFORMIN HYDROCHLORIDE 500 MG/1
500 TABLET, EXTENDED RELEASE ORAL
Qty: 30 TABLET | Refills: 5 | Status: SHIPPED | OUTPATIENT
Start: 2023-04-17

## 2023-04-17 NOTE — TELEPHONE ENCOUNTER
Deanna Thomas is calling to request a refill on the following medication(s):    Last Visit Date (If Applicable):  8/9/3336    Next Visit Date:    Visit date not found    Medication Request:  Requested Prescriptions     Pending Prescriptions Disp Refills    metFORMIN (GLUCOPHAGE-XR) 500 MG extended release tablet 30 tablet 5     Sig: Take 1 tablet by mouth daily (with breakfast)

## 2023-04-25 ENCOUNTER — HOSPITAL ENCOUNTER (OUTPATIENT)
Age: 38
Setting detail: SPECIMEN
Discharge: HOME | End: 2023-04-25

## 2023-04-25 DIAGNOSIS — E78.00 PURE HYPERCHOLESTEROLEMIA: ICD-10-CM

## 2023-04-25 DIAGNOSIS — R74.8 ELEVATED LIVER ENZYMES: ICD-10-CM

## 2023-04-25 DIAGNOSIS — R73.03 PRE-DIABETES: ICD-10-CM

## 2023-04-25 DIAGNOSIS — E55.9 VITAMIN D DEFICIENCY: ICD-10-CM

## 2023-04-25 LAB
25(OH)D3 SERPL-MCNC: 28.2 NG/ML
ALBUMIN SERPL-MCNC: 4.7 G/DL (ref 3.5–5.2)
ALBUMIN/GLOBULIN RATIO: 1.6 (ref 1–2.5)
ALP SERPL-CCNC: 42 U/L (ref 40–129)
ALT SERPL-CCNC: 48 U/L (ref 5–41)
AST SERPL-CCNC: 25 U/L
BILIRUB DIRECT SERPL-MCNC: 0.1 MG/DL
BILIRUB INDIRECT SERPL-MCNC: 0.6 MG/DL (ref 0–1)
BILIRUB SERPL-MCNC: 0.7 MG/DL (ref 0.3–1.2)
CHOLEST SERPL-MCNC: 179 MG/DL
CHOLESTEROL/HDL RATIO: 3.5
HDLC SERPL-MCNC: 51 MG/DL
LDLC SERPL CALC-MCNC: 107 MG/DL (ref 0–130)
PROT SERPL-MCNC: 7.6 G/DL (ref 6.4–8.3)
TRIGL SERPL-MCNC: 104 MG/DL

## 2023-05-23 ENCOUNTER — HOSPITAL ENCOUNTER (OUTPATIENT)
Age: 38
Setting detail: SPECIMEN
Discharge: HOME | End: 2023-05-23

## 2023-05-23 ENCOUNTER — OFFICE VISIT (OUTPATIENT)
Dept: FAMILY MEDICINE CLINIC | Age: 38
End: 2023-05-23
Payer: COMMERCIAL

## 2023-05-23 VITALS
BODY MASS INDEX: 29.12 KG/M2 | WEIGHT: 181.2 LBS | HEART RATE: 70 BPM | SYSTOLIC BLOOD PRESSURE: 120 MMHG | HEIGHT: 66 IN | OXYGEN SATURATION: 98 % | DIASTOLIC BLOOD PRESSURE: 81 MMHG

## 2023-05-23 DIAGNOSIS — R05.1 ACUTE COUGH: ICD-10-CM

## 2023-05-23 DIAGNOSIS — R73.03 PRE-DIABETES: ICD-10-CM

## 2023-05-23 DIAGNOSIS — R73.03 PRE-DIABETES: Primary | ICD-10-CM

## 2023-05-23 LAB
EST. AVERAGE GLUCOSE BLD GHB EST-MCNC: 126 MG/DL
HBA1C MFR BLD: 6 % (ref 4–6)

## 2023-05-23 PROCEDURE — G8427 DOCREV CUR MEDS BY ELIG CLIN: HCPCS | Performed by: FAMILY MEDICINE

## 2023-05-23 PROCEDURE — G8417 CALC BMI ABV UP PARAM F/U: HCPCS | Performed by: FAMILY MEDICINE

## 2023-05-23 PROCEDURE — 1036F TOBACCO NON-USER: CPT | Performed by: FAMILY MEDICINE

## 2023-05-23 PROCEDURE — 99214 OFFICE O/P EST MOD 30 MIN: CPT | Performed by: FAMILY MEDICINE

## 2023-05-23 RX ORDER — GUAIFENESIN 600 MG/1
1200 TABLET, EXTENDED RELEASE ORAL 2 TIMES DAILY
Qty: 40 TABLET | Refills: 0 | Status: SHIPPED | OUTPATIENT
Start: 2023-05-23

## 2023-05-23 ASSESSMENT — PATIENT HEALTH QUESTIONNAIRE - PHQ9
SUM OF ALL RESPONSES TO PHQ QUESTIONS 1-9: 0
SUM OF ALL RESPONSES TO PHQ9 QUESTIONS 1 & 2: 0
SUM OF ALL RESPONSES TO PHQ QUESTIONS 1-9: 0
2. FEELING DOWN, DEPRESSED OR HOPELESS: 0
SUM OF ALL RESPONSES TO PHQ QUESTIONS 1-9: 0
SUM OF ALL RESPONSES TO PHQ QUESTIONS 1-9: 0
1. LITTLE INTEREST OR PLEASURE IN DOING THINGS: 0

## 2023-05-23 NOTE — PROGRESS NOTES
[facility]  [facility] [address] [facility] [address]  Dept: [phone]      Lee Graham is a 41 y.o. male who presents today for follow up on his  medical conditions as noted below. Chief Complaint   Patient presents with    Diabetes       There is no problem list on file for this patient. History reviewed. No pertinent past medical history. No past surgical history on file. No family history on file. Current Outpatient Medications   Medication Sig Dispense Refill    guaiFENesin (MUCINEX) 600 MG extended release tablet Take 2 tablets by mouth 2 times daily 40 tablet 0    metFORMIN (GLUCOPHAGE-XR) 500 MG extended release tablet Take 1 tablet by mouth daily (with breakfast) 30 tablet 5    Multiple Vitamin (MULTI VITAMIN DAILY PO) Take by mouth (Patient not taking: Reported on 2023)      esomeprazole Magnesium (NEXIUM) 20 MG PACK Take 20 mg by mouth in the morning. (Patient not taking: Reported on 2023)       No current facility-administered medications for this visit.      ALLERGIES:  No Known Allergies    Social History     Tobacco Use    Smoking status: Former     Packs/day: 1.00     Years: 12.00     Pack years: 12.00     Types: Cigarettes     Quit date:      Years since quittin.3    Smokeless tobacco: Never   Substance Use Topics    Alcohol use: Not on file        LDL Cholesterol (mg/dL)   Date Value   2023 107     HDL (mg/dL)   Date Value   2023 51     BUN (mg/dL)   Date Value   2022 11     Creatinine (mg/dL)   Date Value   2022 0.65 (L)     Glucose (mg/dL)   Date Value   2022 117 (H)     Hemoglobin A1C (%)   Date Value   2023 6.4              Subjective:      HPI  He is being seen today for follow-up on prediabetes he has lost some more weight and is doing better with that  We will send for hemoglobin A1c today he states over the last 6 days he has had a bit of a cough and